# Patient Record
Sex: FEMALE | Race: WHITE | NOT HISPANIC OR LATINO
[De-identification: names, ages, dates, MRNs, and addresses within clinical notes are randomized per-mention and may not be internally consistent; named-entity substitution may affect disease eponyms.]

---

## 2018-03-13 ENCOUNTER — TRANSCRIPTION ENCOUNTER (OUTPATIENT)
Age: 53
End: 2018-03-13

## 2019-05-22 ENCOUNTER — TRANSCRIPTION ENCOUNTER (OUTPATIENT)
Age: 54
End: 2019-05-22

## 2020-02-04 PROBLEM — Z00.00 ENCOUNTER FOR PREVENTIVE HEALTH EXAMINATION: Status: ACTIVE | Noted: 2020-02-04

## 2020-02-05 ENCOUNTER — APPOINTMENT (OUTPATIENT)
Dept: GASTROENTEROLOGY | Facility: CLINIC | Age: 55
End: 2020-02-05
Payer: COMMERCIAL

## 2020-02-05 VITALS
OXYGEN SATURATION: 96 % | SYSTOLIC BLOOD PRESSURE: 122 MMHG | BODY MASS INDEX: 38.45 KG/M2 | HEART RATE: 85 BPM | WEIGHT: 245 LBS | HEIGHT: 67 IN | RESPIRATION RATE: 14 BRPM | DIASTOLIC BLOOD PRESSURE: 80 MMHG

## 2020-02-05 DIAGNOSIS — Z78.9 OTHER SPECIFIED HEALTH STATUS: ICD-10-CM

## 2020-02-05 DIAGNOSIS — Z86.79 PERSONAL HISTORY OF OTHER DISEASES OF THE CIRCULATORY SYSTEM: ICD-10-CM

## 2020-02-05 DIAGNOSIS — Z86.39 PERSONAL HISTORY OF OTHER ENDOCRINE, NUTRITIONAL AND METABOLIC DISEASE: ICD-10-CM

## 2020-02-05 DIAGNOSIS — I25.2 OLD MYOCARDIAL INFARCTION: ICD-10-CM

## 2020-02-05 DIAGNOSIS — Z12.11 ENCOUNTER FOR SCREENING FOR MALIGNANT NEOPLASM OF COLON: ICD-10-CM

## 2020-02-05 DIAGNOSIS — Z80.1 FAMILY HISTORY OF MALIGNANT NEOPLASM OF TRACHEA, BRONCHUS AND LUNG: ICD-10-CM

## 2020-02-05 DIAGNOSIS — Z82.49 FAMILY HISTORY OF ISCHEMIC HEART DISEASE AND OTHER DISEASES OF THE CIRCULATORY SYSTEM: ICD-10-CM

## 2020-02-05 PROCEDURE — 99244 OFF/OP CNSLTJ NEW/EST MOD 40: CPT

## 2020-02-05 RX ORDER — AMLODIPINE BESYLATE 5 MG/1
TABLET ORAL
Refills: 0 | Status: ACTIVE | COMMUNITY

## 2020-02-05 RX ORDER — ATORVASTATIN CALCIUM 80 MG/1
TABLET, FILM COATED ORAL
Refills: 0 | Status: ACTIVE | COMMUNITY

## 2020-02-05 RX ORDER — EPLERENONE 50 MG/1
TABLET, COATED ORAL
Refills: 0 | Status: ACTIVE | COMMUNITY

## 2020-02-05 RX ORDER — ESCITALOPRAM OXALATE 5 MG/1
TABLET, FILM COATED ORAL
Refills: 0 | Status: ACTIVE | COMMUNITY

## 2020-02-05 NOTE — HISTORY OF PRESENT ILLNESS
[de-identified] : 55 y/o woman with hx of MI, CAD s/p stent in Aug of 2018 on aspirin only, HTN, hyperlipidemia, who is referred by her cardiologist for a screening colonoscopy. \par \par \par Never had a colonoscopy. \par \par Patient reports that she had stent in 2018 and not in 2019 as written by cardiologist -she says this is an error.  She was on Brilinta and taken off in Sept of 2019. \par \par Pt denies having abdominal pain, heartburn, dysphagia, odynophagia, nausea, vomiting, fevers, chills, jaundice, loss of appetite, wt loss, constipation, diarrhea, hematochezia and melena.\par \par Patient denies family hx of GI cancers.\par \par All other review of systems are negative.  Denies cardiac symptoms.\par \par \par \par

## 2020-02-05 NOTE — ASSESSMENT
[FreeTextEntry1] : 53 y/o woman with hx of MI, CAD s/p stent in Aug of 2018 on aspirin only, HTN, hyperlipidemia, who is referred by her cardiologist for a screening colonoscopy. \par \par I had a long discuss with the patient regarding colon cancer in the [place].    \par \par We reviewed risk factors for colon cancer which include age, having comorbidities such as obesity, DM, cardiac disease, having nicotine addiction, alcohol addiction, having a family hx of colon cancer, cancer syndromes, etc.  \par \par Patient is average risk for colon cancer.  \par \par We reviewed the screening tests for colon cancer prevention.  We discussed screening tests such as stool test, CT scans such as CT colonography, and a colonoscopy.    Patient was made aware that despite these screening test, a cancerous lesion can still be missed.  The gold standard test is a screening colonoscopy.\par \par I have recommended a colonoscopy to rule out colon polyps, colorectal cancer etc. under monitored anesthesia care.  Risks such as perforation requiring surgery, bleeding, infection, diverticulitis, colitis, missed colon cancer (2% to 6%), internal organ injury, etc, risks of bowel prep including colitis, syncope, adverse reaction to medication etc. and risks of anesthesia including cardiopulmonary compromise were discussed with patient.  Patient verbalized understanding.  She would to think about the screening test and let us know how she wishes.  \par \par She would need clearance from her cardiologist if she does decide to undergo a coloscopy.  She was advised not  to off on aspirin if she decides to undergo a colonoscopy. \par

## 2020-02-05 NOTE — PHYSICAL EXAM
[General Appearance - In No Acute Distress] : in no acute distress [General Appearance - Alert] : alert [Sclera] : the sclera and conjunctiva were normal [Extraocular Movements] : extraocular movements were intact [Outer Ear] : the ears and nose were normal in appearance [Hearing Threshold Finger Rub Not [place]] : hearing was normal [Neck Appearance] : the appearance of the neck was normal [Neck Cervical Mass (___cm)] : no neck mass was observed [Auscultation Breath Sounds / Voice Sounds] : lungs were clear to auscultation bilaterally [Heart Rate And Rhythm] : heart rate was normal and rhythm regular [Heart Sounds] : normal S1 and S2 [Heart Sounds Gallop] : no gallops [Murmurs] : no murmurs [Heart Sounds Pericardial Friction Rub] : no pericardial rub [Bowel Sounds] : normal bowel sounds [Abdomen Tenderness] : non-tender [Abdomen Soft] : soft [Abdomen Mass (___ Cm)] : no abdominal mass palpated [Cervical Lymph Nodes Enlarged Posterior Bilaterally] : posterior cervical [Cervical Lymph Nodes Enlarged Anterior Bilaterally] : anterior cervical [Supraclavicular Lymph Nodes Enlarged Bilaterally] : supraclavicular [Abnormal Walk] : normal gait [Nail Clubbing] : no clubbing  or cyanosis of the fingernails [Skin Color & Pigmentation] : normal skin color and pigmentation [Oriented To Time, Place, And Person] : oriented to person, place, and time [] : no rash [Impaired Insight] : insight and judgment were intact [Affect] : the affect was normal

## 2020-05-15 ENCOUNTER — TRANSCRIPTION ENCOUNTER (OUTPATIENT)
Age: 55
End: 2020-05-15

## 2020-09-23 ENCOUNTER — RECORD ABSTRACTING (OUTPATIENT)
Age: 55
End: 2020-09-23

## 2020-09-29 ENCOUNTER — APPOINTMENT (OUTPATIENT)
Dept: OTOLARYNGOLOGY | Facility: CLINIC | Age: 55
End: 2020-09-29

## 2020-12-16 ENCOUNTER — TRANSCRIPTION ENCOUNTER (OUTPATIENT)
Age: 55
End: 2020-12-16

## 2021-03-17 ENCOUNTER — TRANSCRIPTION ENCOUNTER (OUTPATIENT)
Age: 56
End: 2021-03-17

## 2021-06-14 ENCOUNTER — TRANSCRIPTION ENCOUNTER (OUTPATIENT)
Age: 56
End: 2021-06-14

## 2021-09-03 ENCOUNTER — TRANSCRIPTION ENCOUNTER (OUTPATIENT)
Age: 56
End: 2021-09-03

## 2022-04-01 ENCOUNTER — EMERGENCY (EMERGENCY)
Facility: HOSPITAL | Age: 57
LOS: 1 days | Discharge: HOME | End: 2022-04-01
Attending: EMERGENCY MEDICINE | Admitting: EMERGENCY MEDICINE
Payer: COMMERCIAL

## 2022-04-01 VITALS
SYSTOLIC BLOOD PRESSURE: 165 MMHG | OXYGEN SATURATION: 100 % | TEMPERATURE: 97 F | WEIGHT: 240.08 LBS | RESPIRATION RATE: 16 BRPM | DIASTOLIC BLOOD PRESSURE: 92 MMHG | HEART RATE: 81 BPM

## 2022-04-01 DIAGNOSIS — I21.9 ACUTE MYOCARDIAL INFARCTION, UNSPECIFIED: Chronic | ICD-10-CM

## 2022-04-01 DIAGNOSIS — Z90.5 ACQUIRED ABSENCE OF KIDNEY: Chronic | ICD-10-CM

## 2022-04-01 DIAGNOSIS — Z98.891 HISTORY OF UTERINE SCAR FROM PREVIOUS SURGERY: Chronic | ICD-10-CM

## 2022-04-01 DIAGNOSIS — Z98.890 OTHER SPECIFIED POSTPROCEDURAL STATES: Chronic | ICD-10-CM

## 2022-04-01 DIAGNOSIS — Z95.828 PRESENCE OF OTHER VASCULAR IMPLANTS AND GRAFTS: Chronic | ICD-10-CM

## 2022-04-01 LAB
ALBUMIN SERPL ELPH-MCNC: 3.8 G/DL — SIGNIFICANT CHANGE UP (ref 3.3–5)
ALP SERPL-CCNC: 124 U/L — HIGH (ref 40–120)
ALT FLD-CCNC: 38 U/L — SIGNIFICANT CHANGE UP (ref 12–78)
ANION GAP SERPL CALC-SCNC: 9 MMOL/L — SIGNIFICANT CHANGE UP (ref 5–17)
APPEARANCE UR: CLEAR — SIGNIFICANT CHANGE UP
AST SERPL-CCNC: 33 U/L — SIGNIFICANT CHANGE UP (ref 15–37)
BASOPHILS # BLD AUTO: 0.04 K/UL — SIGNIFICANT CHANGE UP (ref 0–0.2)
BASOPHILS NFR BLD AUTO: 0.6 % — SIGNIFICANT CHANGE UP (ref 0–2)
BILIRUB SERPL-MCNC: 0.5 MG/DL — SIGNIFICANT CHANGE UP (ref 0.2–1.2)
BILIRUB UR-MCNC: NEGATIVE — SIGNIFICANT CHANGE UP
BUN SERPL-MCNC: 23 MG/DL — SIGNIFICANT CHANGE UP (ref 7–23)
CALCIUM SERPL-MCNC: 9.3 MG/DL — SIGNIFICANT CHANGE UP (ref 8.5–10.1)
CHLORIDE SERPL-SCNC: 105 MMOL/L — SIGNIFICANT CHANGE UP (ref 96–108)
CO2 SERPL-SCNC: 27 MMOL/L — SIGNIFICANT CHANGE UP (ref 22–31)
COLOR SPEC: YELLOW — SIGNIFICANT CHANGE UP
CREAT SERPL-MCNC: 1.1 MG/DL — SIGNIFICANT CHANGE UP (ref 0.5–1.3)
DIFF PNL FLD: NEGATIVE — SIGNIFICANT CHANGE UP
EGFR: 58 ML/MIN/1.73M2 — LOW
EOSINOPHIL # BLD AUTO: 0.3 K/UL — SIGNIFICANT CHANGE UP (ref 0–0.5)
EOSINOPHIL NFR BLD AUTO: 4.7 % — SIGNIFICANT CHANGE UP (ref 0–6)
GLUCOSE SERPL-MCNC: 80 MG/DL — SIGNIFICANT CHANGE UP (ref 70–99)
GLUCOSE UR QL: NEGATIVE — SIGNIFICANT CHANGE UP
HCT VFR BLD CALC: 39 % — SIGNIFICANT CHANGE UP (ref 34.5–45)
HGB BLD-MCNC: 12.7 G/DL — SIGNIFICANT CHANGE UP (ref 11.5–15.5)
IMM GRANULOCYTES NFR BLD AUTO: 0.3 % — SIGNIFICANT CHANGE UP (ref 0–1.5)
KETONES UR-MCNC: NEGATIVE — SIGNIFICANT CHANGE UP
LEUKOCYTE ESTERASE UR-ACNC: NEGATIVE — SIGNIFICANT CHANGE UP
LIDOCAIN IGE QN: 122 U/L — SIGNIFICANT CHANGE UP (ref 73–393)
LYMPHOCYTES # BLD AUTO: 0.98 K/UL — LOW (ref 1–3.3)
LYMPHOCYTES # BLD AUTO: 15.4 % — SIGNIFICANT CHANGE UP (ref 13–44)
MCHC RBC-ENTMCNC: 25.1 PG — LOW (ref 27–34)
MCHC RBC-ENTMCNC: 32.6 GM/DL — SIGNIFICANT CHANGE UP (ref 32–36)
MCV RBC AUTO: 77.1 FL — LOW (ref 80–100)
MONOCYTES # BLD AUTO: 0.59 K/UL — SIGNIFICANT CHANGE UP (ref 0–0.9)
MONOCYTES NFR BLD AUTO: 9.2 % — SIGNIFICANT CHANGE UP (ref 2–14)
NEUTROPHILS # BLD AUTO: 4.45 K/UL — SIGNIFICANT CHANGE UP (ref 1.8–7.4)
NEUTROPHILS NFR BLD AUTO: 69.8 % — SIGNIFICANT CHANGE UP (ref 43–77)
NITRITE UR-MCNC: NEGATIVE — SIGNIFICANT CHANGE UP
NRBC # BLD: 0 /100 WBCS — SIGNIFICANT CHANGE UP (ref 0–0)
PH UR: 6 — SIGNIFICANT CHANGE UP (ref 5–8)
PLATELET # BLD AUTO: 205 K/UL — SIGNIFICANT CHANGE UP (ref 150–400)
POTASSIUM SERPL-MCNC: 4.5 MMOL/L — SIGNIFICANT CHANGE UP (ref 3.5–5.3)
POTASSIUM SERPL-SCNC: 4.5 MMOL/L — SIGNIFICANT CHANGE UP (ref 3.5–5.3)
PROT SERPL-MCNC: 7.8 G/DL — SIGNIFICANT CHANGE UP (ref 6–8.3)
PROT UR-MCNC: NEGATIVE — SIGNIFICANT CHANGE UP
RBC # BLD: 5.06 M/UL — SIGNIFICANT CHANGE UP (ref 3.8–5.2)
RBC # FLD: 16.1 % — HIGH (ref 10.3–14.5)
SARS-COV-2 RNA SPEC QL NAA+PROBE: SIGNIFICANT CHANGE UP
SODIUM SERPL-SCNC: 141 MMOL/L — SIGNIFICANT CHANGE UP (ref 135–145)
SP GR SPEC: 1.01 — SIGNIFICANT CHANGE UP (ref 1.01–1.02)
UROBILINOGEN FLD QL: NEGATIVE — SIGNIFICANT CHANGE UP
WBC # BLD: 6.38 K/UL — SIGNIFICANT CHANGE UP (ref 3.8–10.5)
WBC # FLD AUTO: 6.38 K/UL — SIGNIFICANT CHANGE UP (ref 3.8–10.5)

## 2022-04-01 PROCEDURE — 99285 EMERGENCY DEPT VISIT HI MDM: CPT

## 2022-04-01 PROCEDURE — 76705 ECHO EXAM OF ABDOMEN: CPT | Mod: 26

## 2022-04-01 RX ORDER — SODIUM CHLORIDE 9 MG/ML
1000 INJECTION INTRAMUSCULAR; INTRAVENOUS; SUBCUTANEOUS ONCE
Refills: 0 | Status: COMPLETED | OUTPATIENT
Start: 2022-04-01 | End: 2022-04-01

## 2022-04-01 RX ORDER — ONDANSETRON 8 MG/1
4 TABLET, FILM COATED ORAL ONCE
Refills: 0 | Status: COMPLETED | OUTPATIENT
Start: 2022-04-01 | End: 2022-04-01

## 2022-04-01 RX ORDER — MORPHINE SULFATE 50 MG/1
4 CAPSULE, EXTENDED RELEASE ORAL ONCE
Refills: 0 | Status: DISCONTINUED | OUTPATIENT
Start: 2022-04-01 | End: 2022-04-01

## 2022-04-01 RX ADMIN — SODIUM CHLORIDE 1000 MILLILITER(S): 9 INJECTION INTRAMUSCULAR; INTRAVENOUS; SUBCUTANEOUS at 21:08

## 2022-04-01 RX ADMIN — SODIUM CHLORIDE 1000 MILLILITER(S): 9 INJECTION INTRAMUSCULAR; INTRAVENOUS; SUBCUTANEOUS at 22:08

## 2022-04-01 RX ADMIN — MORPHINE SULFATE 4 MILLIGRAM(S): 50 CAPSULE, EXTENDED RELEASE ORAL at 21:39

## 2022-04-01 RX ADMIN — ONDANSETRON 4 MILLIGRAM(S): 8 TABLET, FILM COATED ORAL at 21:09

## 2022-04-01 RX ADMIN — MORPHINE SULFATE 4 MILLIGRAM(S): 50 CAPSULE, EXTENDED RELEASE ORAL at 21:09

## 2022-04-01 NOTE — ED PROVIDER NOTE - PROGRESS NOTE DETAILS
pt on repeat exam after surg consut more ttp over lower ribs, chest muscle, no pleuritic paiin or sob, will d/c iwht pain meds, fu pmd

## 2022-04-01 NOTE — ED PROVIDER NOTE - NSFOLLOWUPINSTRUCTIONS_ED_ALL_ED_FT
take percocet 1-2 tabs every 6 hours as needed for pain,  return for fever, shortness of breath, worsening pain.  call dr cox on monday for follow up.

## 2022-04-01 NOTE — ED PROVIDER NOTE - CLINICAL SUMMARY MEDICAL DECISION MAKING FREE TEXT BOX
Pt is a 57 yo female hx cad with stents,  c/o RUQ pain x 5 days. Pt states that pain is getting worse, constant and sharp, no  alleviating factors. States that she was nauseous today. Pt was seen in urgent care yesterday and referred to get US of gallbladder but has not had it done yet. Denies fever, V/D, CP, SOB.  pt states pain in worse on sitting up and laughing, non pleuritic.  pt on exam uncomfortable,  lungs cta, abd with ruq on initial exam, no murphys, labs essentially unremarkable.  surg consulted,  Pt tx with pain meds, ivf.  surg consult revealed ttp more over right lower rib cage anteriorly,  repeat exam c/w surg, pt wishes to leave and refusing cxr, will dc on pain meds, return for fever or increased sx.  fu pmd

## 2022-04-01 NOTE — ED PROVIDER NOTE - CARE PROVIDER_API CALL
Godwin Morillo  [facility] MEDICINE  Internal Medicine, [address], [address]  [place] [address]  Phone: [phone]  Fax: [phone]  Follow Up Time: 1-3 Days

## 2022-04-01 NOTE — ED PROVIDER NOTE - PATIENT PORTAL LINK FT
You can access the FollowMyHealth Patient Portal offered by [facility] by registering at the following website: [email or web address]. By joining [employer]’s FollowMyHealth portal, you will also be able to view your health information using other applications (apps) compatible with our system.

## 2022-04-01 NOTE — ED ADULT TRIAGE NOTE - CHIEF COMPLAINT QUOTE
RUQ abdominal pain x3 days, worsened today. Intermittent nausea, denies vomiting. Pt applied lidocaine patch to RUQ with minimal relief.

## 2022-04-01 NOTE — ED PROVIDER NOTE - OBJECTIVE STATEMENT
55 y/o F with hx of CAD with stent on asa, HTN, HLD, hx of L nephrectomy (donated kidney) presents with c/o RUQ pain x 5 days. Pt states that pain is getting worse, constant and sharp, no provoking or alleviating factors. States that she was nauseous today. Pt was seen in urgent care yesterday and referred to get US of gallbladder but has not had it done yet. Denies fever, V/D, CP, SOB, hx of abdominal surgeries, urinary symptoms or other symptoms.

## 2022-04-01 NOTE — ED ADULT NURSE NOTE - OBJECTIVE STATEMENT
Pt presents to the ED s/p right upper quad pain since Monday night, worse today after eating at [employer]. Pt c/o some nausea.

## 2022-04-01 NOTE — ED PROVIDER NOTE - NS ED ATTENDING STATEMENT MOD
This was a shared visit with the VANDANA. I reviewed and verified the documentation and independently performed the documented:

## 2022-04-01 NOTE — ED PROVIDER NOTE - CARE PLAN
1 Principal Discharge DX:	Abdominal pain   Principal Discharge DX:	Abdominal pain  Secondary Diagnosis:	Chest wall pain

## 2022-04-01 NOTE — ED ADULT NURSE NOTE - NSICDXPASTSURGICALHX_GEN_ALL_CORE_FT
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

## 2022-04-01 NOTE — ED ADULT NURSE NOTE - NSIMPLEMENTINTERV_GEN_ALL_ED
Implemented All Universal Safety Interventions:  [place] to call system. Call bell, personal items and telephone within reach. Instruct patient to call for assistance. Room bathroom lighting operational. Non-slip footwear when patient is off stretcher. Physically safe environment: no spills, clutter or unnecessary equipment. Stretcher in lowest position, wheels locked, appropriate side rails in place.

## 2022-04-01 NOTE — ED PROVIDER NOTE - CONSTITUTIONAL, MLM
normal... Well appearing, awake, alert, oriented to person, place, time/situation and in moderate distress from pain

## 2022-04-02 ENCOUNTER — TRANSCRIPTION ENCOUNTER (OUTPATIENT)
Age: 57
End: 2022-04-02

## 2022-04-02 VITALS
SYSTOLIC BLOOD PRESSURE: 112 MMHG | OXYGEN SATURATION: 98 % | DIASTOLIC BLOOD PRESSURE: 80 MMHG | RESPIRATION RATE: 17 BRPM | TEMPERATURE: 98 F | HEART RATE: 84 BPM

## 2022-04-02 PROCEDURE — 96375 TX/PRO/DX INJ NEW DRUG ADDON: CPT

## 2022-04-02 PROCEDURE — 83690 ASSAY OF LIPASE: CPT

## 2022-04-02 PROCEDURE — 80053 COMPREHEN METABOLIC PANEL: CPT

## 2022-04-02 PROCEDURE — 76705 ECHO EXAM OF ABDOMEN: CPT

## 2022-04-02 PROCEDURE — 87635 SARS-COV-2 COVID-19 AMP PRB: CPT

## 2022-04-02 PROCEDURE — 81003 URINALYSIS AUTO W/O SCOPE: CPT

## 2022-04-02 PROCEDURE — 99284 EMERGENCY DEPT VISIT MOD MDM: CPT | Mod: 25

## 2022-04-02 PROCEDURE — 85025 COMPLETE CBC W/AUTO DIFF WBC: CPT

## 2022-04-02 PROCEDURE — 96374 THER/PROPH/DIAG INJ IV PUSH: CPT

## 2022-04-02 PROCEDURE — 36415 COLL VENOUS BLD VENIPUNCTURE: CPT

## 2022-04-02 RX ORDER — OXYCODONE HYDROCHLORIDE 5 MG/1
2 TABLET ORAL
Qty: 24 | Refills: 0
Start: 2022-04-02 | End: 2022-04-04

## 2022-04-02 RX ORDER — OXYCODONE AND ACETAMINOPHEN 5; 325 MG/1; MG/1
1 TABLET ORAL ONCE
Refills: 0 | Status: DISCONTINUED | OUTPATIENT
Start: 2022-04-02 | End: 2022-04-02

## 2022-04-02 RX ORDER — ONDANSETRON 8 MG/1
1 TABLET, FILM COATED ORAL
Qty: 9 | Refills: 0
Start: 2022-04-02 | End: 2022-04-04

## 2022-04-02 RX ORDER — OXYCODONE AND ACETAMINOPHEN 5; 325 MG/1; MG/1
2 TABLET ORAL ONCE
Refills: 0 | Status: DISCONTINUED | OUTPATIENT
Start: 2022-04-02 | End: 2022-04-02

## 2022-04-02 RX ADMIN — OXYCODONE AND ACETAMINOPHEN 1 TABLET(S): 5; 325 TABLET ORAL at 03:30

## 2022-04-02 NOTE — CONSULT NOTE ADULT - SUBJECTIVE AND OBJECTIVE BOX
SAURABH ALVAREZ  [id number]  56y    THIS IS A 57 YO FEMALE WITH PAST MEDICAL HISTORY OF CAD, MI  S/P X2  CORONARY STENT 2018, HTN, HYPERLIPEMIA EXPERIENCING RIGHT UPPER ABDOMINAL DISCOMFORT SINCE MONDAY WITH NO AGGREGATING / ALLEVIATING FACTORS EXCEPT FOR  EXERCISES DURING PHYSICAL THERAPY FOR HER RECENT LEFT SHOULDER REPLACEMENT. SHE WAS SEEN IN URGENT CARE CENTER AND WAS GIVEN RX FOR ABDOMINAL US. SHE HAS AN APPOINTMENT WITH RADIOLOGY ON MONDAY. FOR THE FIRST TIME  PAIN GOT WORSE AFTER EATING [employer] AND SHE DECIDED TO COME TO HOSPITAL.    SHE DENIES ANY FEVER, CHILLS, N/V, NSAIDs USE, RECENT RAVEL, HEPATITIS, ETOH USE, FOOD INTOLERANCE, CHANGE IN BOWEL HABITS, PERSONAL/ FAMILY  HISTORY OF GALLSTONES, SOB, PLEURITIC CHEST PAIN, COUGH, TRAUMA / FALLS. SHE HAD GASTRIC / DUODENAL ULCER MANY YEARS AGO AND HAS NOT HAD ANY SYMPTOMS FOR YEARS.     HER RUQ PAIN/ DISCOMFORT IS POSITIONAL, INTENSIFIES BY MOVEMENTS OF HER RIGHT ARM AND RADIATES TO POSTERIOR RIB CAGE.                 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   History of   H/O kidney removal , S/P  LAPAROSCOPY LEFT KIDNEY DONATION   Acute myocardial infarction   H/O T7-T9 COMPRESSION FX POST FALL 2 YEARS AGO     Allergies  No Known Allergies    TOBACCO USE: DENIES   ALCOHOL USE: DENIES   DRUG USE: DENIES       REVIEW OF SYSTEMS: SEE PAST MEDICAL AND SURGICAL HISTORY    CONSTITUTIONAL: No weakness, fevers or chills  EYES/ENT: No visual changes;  No vertigo or throat pain   NECK: No pain or stiffness  RESPIRATORY: No cough, wheezing, hemoptysis; No shortness of breath  CARDIOVASCULAR: No chest pain or palpitations  GASTROINTESTINAL: SEE HPI   GENITOURINARY: No dysuria, frequency or hematuria  NEUROLOGICAL: No numbness or weakness  SKIN: No itching, rashes       Vital Signs (24 Hrs):  T(C): 36.3 (22 @ 22:29), Max: 36.3 (22 @ 22:29)  HR: 73 (22 @ 22:29) (73 - 81)  BP: 110/75 (22 @ 22:29) (110/75 - 165/92)  RR: 18 (22 @ 22:29) (16 - 18)  SpO2: 97% (22 @ 22:29) (97% - 100%)    PHYSICAL EXAM:    GENERAL: NAD  HEAD:  ATRAUMATIC, NORMOCEPHALIC  EYES: EOMI, PERRLA, CONJUNCTIVA AND SCLERA CLEAR   ENT: MOIST MUCOUS MEMBRANE   NECK: SUPPLE, NO JVD  CHEST/LUNG: TENDERNESS OVER RIGHT ANTERIOR > POSTERIOR RIB CAGE, MORE PRONOUNCED UNDER RIGHT BREAST, NO OBVIOUS TRAUMA. BRUISING. CLEAR TO AUSCULTATION, NO W/R/R  HEART: REGULAR RATE, RHYTHM, NO MURMUR, RUBS, GALLOPS  ABDOMEN: WELL HEALED  PFANNENSTIEL SCAR, OBESE, + BS, SOFT, NOT DISTENDED, NO PALPABLE MASS, NON TENDER TO PALPATION, NEGATIVE ANDRES'S SIGN, NO GUARDING/ RIGIDITY. NO CVA  TENDERNESS   EXTREMITIES:  2+ PERIPHERAL PULSES, BRISK CAPILLARY REFILL. NO CLUBBING, CYANOSIS, OR EDEMA.   NERVOUS SYSTEM: ALERT AND ORIENTED X3, CLEAR SPEECH . NO DEFICITS   MUSCULOSKELETAL : SCAR OVER LEFT SHOULDER , DECREASE ROM LEFT SHOULDER, FULL AND EQUAL STRENGTH   SKIN: NO RASH, INTACT                       12.7   6.38  )-----------( 205      ( 2022 21:06 )             39.0     2022 21:06    141    |  105    |  23     ----------------------------<  80     4.5     |  27     |  1.10     Ca    9.3        2022 21:06    TPro  7.8    /  Alb  3.8    /  TBili  0.5    /  DBili  x      /  AST  33     /  ALT  38     /  AlkPhos  124    2022 21:06    Urinalysis Basic - ( 2022 21:06 )    Color: Yellow / Appearance: Clear / S.015 / pH: x  Gluc: x / Ketone: Negative  / Bili: Negative / Urobili: Negative   Blood: x / Protein: Negative / Nitrite: Negative   Leuk Esterase: Negative / RBC: x / WBC x   Sq Epi: x / Non Sq Epi: x / Bacteria: x    COVID-19 PCR: NotDetec (2022 21:06)    ACC: [id number] EXAM:  US ABDOMEN RT UPR QUADRANT                        PROCEDURE DATE:  2022      INTERPRETATION:  CLINICAL INFORMATION: Right upper quadrant pain.    COMPARISON: None available.    TECHNIQUE: Sonography of the right upper quadrant.    FINDINGS:    Liver: Within normal limits.  Bile ducts: Normal caliber. Common bile duct measures 6 mm.  Gallbladder: No cholelithiasis, gallbladder wall thickening, or   pericholecystic fluid. Negative sonographic Andres sign, however patient   was premedicated prior to the examination.  Pancreas: Poorly visualized.  Right kidney: 11.4 cm. No hydronephrosis.  Ascites: None.  Aorta/IVC: Poorly visualized.    IMPRESSION:    No cholelithiasis or sonographic evidence of acute cholecystitis.    RAMANA REGAN MD; Attending Radiologist    ASSESSMENT AND PLAN     RIGHT RIB CAGE PAIN,   ONE EPISODE OF POST PRANDIAL RUQ / RIB CAGE PAIN., NO OTHER SYMPTOMS, LABORATORY OR US EVIDENCE OF GALLBLADDER PATHOLOGY/ CHOLECYSTITIS   CAD, MI  S/P X2  CORONARY STENT 2018, HTN, HYPERLIPEMIA   S/P LAPAROSCOPIC LEFT KIDNEY DONATION     UNLIKELY RIGHT RIB CAGE RELATED TO INTRA - ABDOMINAL / GB PATHOLOGY OR PUD   RECOMMEND CHEST X-RAY NOW   CONSIDER CT CHEST, ABDOMEN  AND PELVIS IF SYMPTOMS NOT IMPROVED   MAY FOLLOW UP WITH DR. ANDERSON AS OUT PATIENT    PLAN DISCUSSED WITH ER DR. MCCAIN , PATIENT AND HER

## 2022-06-27 ENCOUNTER — NON-APPOINTMENT (OUTPATIENT)
Age: 57
End: 2022-06-27

## 2022-09-29 ENCOUNTER — EMERGENCY (EMERGENCY)
Facility: HOSPITAL | Age: 57
LOS: 1 days | Discharge: HOME | End: 2022-09-29
Attending: EMERGENCY MEDICINE | Admitting: EMERGENCY MEDICINE
Payer: COMMERCIAL

## 2022-09-29 VITALS
TEMPERATURE: 98 F | HEART RATE: 80 BPM | DIASTOLIC BLOOD PRESSURE: 67 MMHG | OXYGEN SATURATION: 100 % | SYSTOLIC BLOOD PRESSURE: 154 MMHG | RESPIRATION RATE: 16 BRPM

## 2022-09-29 VITALS
HEIGHT: 67 IN | HEART RATE: 60 BPM | RESPIRATION RATE: 17 BRPM | TEMPERATURE: 98 F | OXYGEN SATURATION: 99 % | DIASTOLIC BLOOD PRESSURE: 78 MMHG | WEIGHT: 218.04 LBS | SYSTOLIC BLOOD PRESSURE: 138 MMHG

## 2022-09-29 DIAGNOSIS — Z98.890 OTHER SPECIFIED POSTPROCEDURAL STATES: Chronic | ICD-10-CM

## 2022-09-29 DIAGNOSIS — Z98.891 HISTORY OF UTERINE SCAR FROM PREVIOUS SURGERY: Chronic | ICD-10-CM

## 2022-09-29 DIAGNOSIS — Z95.828 PRESENCE OF OTHER VASCULAR IMPLANTS AND GRAFTS: Chronic | ICD-10-CM

## 2022-09-29 DIAGNOSIS — I21.9 ACUTE MYOCARDIAL INFARCTION, UNSPECIFIED: Chronic | ICD-10-CM

## 2022-09-29 DIAGNOSIS — Z90.5 ACQUIRED ABSENCE OF KIDNEY: Chronic | ICD-10-CM

## 2022-09-29 PROBLEM — I10 ESSENTIAL (PRIMARY) HYPERTENSION: Chronic | Status: ACTIVE | Noted: 2022-04-01

## 2022-09-29 PROBLEM — E78.5 HYPERLIPIDEMIA, UNSPECIFIED: Chronic | Status: ACTIVE | Noted: 2022-04-01

## 2022-09-29 LAB
ALBUMIN SERPL ELPH-MCNC: 3.6 G/DL — SIGNIFICANT CHANGE UP (ref 3.3–5)
ALP SERPL-CCNC: 116 U/L — SIGNIFICANT CHANGE UP (ref 40–120)
ALT FLD-CCNC: 28 U/L — SIGNIFICANT CHANGE UP (ref 12–78)
ANION GAP SERPL CALC-SCNC: 7 MMOL/L — SIGNIFICANT CHANGE UP (ref 5–17)
APPEARANCE UR: CLEAR — SIGNIFICANT CHANGE UP
AST SERPL-CCNC: 20 U/L — SIGNIFICANT CHANGE UP (ref 15–37)
BASOPHILS # BLD AUTO: 0.03 K/UL — SIGNIFICANT CHANGE UP (ref 0–0.2)
BASOPHILS NFR BLD AUTO: 0.4 % — SIGNIFICANT CHANGE UP (ref 0–2)
BILIRUB SERPL-MCNC: 0.9 MG/DL — SIGNIFICANT CHANGE UP (ref 0.2–1.2)
BILIRUB UR-MCNC: NEGATIVE — SIGNIFICANT CHANGE UP
BUN SERPL-MCNC: 21 MG/DL — SIGNIFICANT CHANGE UP (ref 7–23)
CALCIUM SERPL-MCNC: 9.3 MG/DL — SIGNIFICANT CHANGE UP (ref 8.5–10.1)
CHLORIDE SERPL-SCNC: 105 MMOL/L — SIGNIFICANT CHANGE UP (ref 96–108)
CO2 SERPL-SCNC: 28 MMOL/L — SIGNIFICANT CHANGE UP (ref 22–31)
COLOR SPEC: SIGNIFICANT CHANGE UP
CREAT SERPL-MCNC: 1.3 MG/DL — SIGNIFICANT CHANGE UP (ref 0.5–1.3)
DIFF PNL FLD: NEGATIVE — SIGNIFICANT CHANGE UP
EGFR: 47 ML/MIN/1.73M2 — LOW
EOSINOPHIL # BLD AUTO: 0.21 K/UL — SIGNIFICANT CHANGE UP (ref 0–0.5)
EOSINOPHIL NFR BLD AUTO: 3 % — SIGNIFICANT CHANGE UP (ref 0–6)
GLUCOSE SERPL-MCNC: 101 MG/DL — HIGH (ref 70–99)
GLUCOSE UR QL: NEGATIVE — SIGNIFICANT CHANGE UP
HCT VFR BLD CALC: 36.7 % — SIGNIFICANT CHANGE UP (ref 34.5–45)
HGB BLD-MCNC: 11.8 G/DL — SIGNIFICANT CHANGE UP (ref 11.5–15.5)
IMM GRANULOCYTES NFR BLD AUTO: 0.1 % — SIGNIFICANT CHANGE UP (ref 0–0.9)
KETONES UR-MCNC: NEGATIVE — SIGNIFICANT CHANGE UP
LEUKOCYTE ESTERASE UR-ACNC: ABNORMAL
LIDOCAIN IGE QN: 91 U/L — SIGNIFICANT CHANGE UP (ref 73–393)
LYMPHOCYTES # BLD AUTO: 0.82 K/UL — LOW (ref 1–3.3)
LYMPHOCYTES # BLD AUTO: 11.6 % — LOW (ref 13–44)
MCHC RBC-ENTMCNC: 25.6 PG — LOW (ref 27–34)
MCHC RBC-ENTMCNC: 32.2 GM/DL — SIGNIFICANT CHANGE UP (ref 32–36)
MCV RBC AUTO: 79.6 FL — LOW (ref 80–100)
MONOCYTES # BLD AUTO: 0.63 K/UL — SIGNIFICANT CHANGE UP (ref 0–0.9)
MONOCYTES NFR BLD AUTO: 8.9 % — SIGNIFICANT CHANGE UP (ref 2–14)
NEUTROPHILS # BLD AUTO: 5.37 K/UL — SIGNIFICANT CHANGE UP (ref 1.8–7.4)
NEUTROPHILS NFR BLD AUTO: 76 % — SIGNIFICANT CHANGE UP (ref 43–77)
NITRITE UR-MCNC: NEGATIVE — SIGNIFICANT CHANGE UP
NRBC # BLD: 0 /100 WBCS — SIGNIFICANT CHANGE UP (ref 0–0)
PH UR: 6.5 — SIGNIFICANT CHANGE UP (ref 5–8)
PLATELET # BLD AUTO: 163 K/UL — SIGNIFICANT CHANGE UP (ref 150–400)
POTASSIUM SERPL-MCNC: 4.3 MMOL/L — SIGNIFICANT CHANGE UP (ref 3.5–5.3)
POTASSIUM SERPL-SCNC: 4.3 MMOL/L — SIGNIFICANT CHANGE UP (ref 3.5–5.3)
PROT SERPL-MCNC: 7 G/DL — SIGNIFICANT CHANGE UP (ref 6–8.3)
PROT UR-MCNC: NEGATIVE — SIGNIFICANT CHANGE UP
RBC # BLD: 4.61 M/UL — SIGNIFICANT CHANGE UP (ref 3.8–5.2)
RBC # FLD: 15 % — HIGH (ref 10.3–14.5)
SODIUM SERPL-SCNC: 140 MMOL/L — SIGNIFICANT CHANGE UP (ref 135–145)
SP GR SPEC: 1 — LOW (ref 1.01–1.02)
UROBILINOGEN FLD QL: NEGATIVE — SIGNIFICANT CHANGE UP
WBC # BLD: 7.07 K/UL — SIGNIFICANT CHANGE UP (ref 3.8–10.5)
WBC # FLD AUTO: 7.07 K/UL — SIGNIFICANT CHANGE UP (ref 3.8–10.5)

## 2022-09-29 PROCEDURE — 85025 COMPLETE CBC W/AUTO DIFF WBC: CPT

## 2022-09-29 PROCEDURE — 99284 EMERGENCY DEPT VISIT MOD MDM: CPT | Mod: 25

## 2022-09-29 PROCEDURE — 80053 COMPREHEN METABOLIC PANEL: CPT

## 2022-09-29 PROCEDURE — 83690 ASSAY OF LIPASE: CPT

## 2022-09-29 PROCEDURE — 36415 COLL VENOUS BLD VENIPUNCTURE: CPT

## 2022-09-29 PROCEDURE — 74177 CT ABD & PELVIS W/CONTRAST: CPT | Mod: ME

## 2022-09-29 PROCEDURE — 81001 URINALYSIS AUTO W/SCOPE: CPT

## 2022-09-29 PROCEDURE — 96374 THER/PROPH/DIAG INJ IV PUSH: CPT | Mod: XU

## 2022-09-29 PROCEDURE — G1004: CPT

## 2022-09-29 PROCEDURE — 99285 EMERGENCY DEPT VISIT HI MDM: CPT

## 2022-09-29 PROCEDURE — 87086 URINE CULTURE/COLONY COUNT: CPT

## 2022-09-29 PROCEDURE — 74177 CT ABD & PELVIS W/CONTRAST: CPT | Mod: 26,ME

## 2022-09-29 RX ORDER — ASPIRIN/CALCIUM CARB/MAGNESIUM 324 MG
0 TABLET ORAL
Qty: 0 | Refills: 0 | DISCHARGE

## 2022-09-29 RX ORDER — SODIUM CHLORIDE 9 MG/ML
1000 INJECTION INTRAMUSCULAR; INTRAVENOUS; SUBCUTANEOUS ONCE
Refills: 0 | Status: COMPLETED | OUTPATIENT
Start: 2022-09-29 | End: 2022-09-29

## 2022-09-29 RX ORDER — EPLERENONE 50 MG/1
0 TABLET, FILM COATED ORAL
Qty: 0 | Refills: 0 | DISCHARGE

## 2022-09-29 RX ORDER — ATORVASTATIN CALCIUM 80 MG/1
1 TABLET, FILM COATED ORAL
Qty: 0 | Refills: 0 | DISCHARGE

## 2022-09-29 RX ORDER — ACETAMINOPHEN 500 MG
1000 TABLET ORAL ONCE
Refills: 0 | Status: COMPLETED | OUTPATIENT
Start: 2022-09-29 | End: 2022-09-29

## 2022-09-29 RX ORDER — METOPROLOL TARTRATE 50 MG
1 TABLET ORAL
Qty: 0 | Refills: 0 | DISCHARGE

## 2022-09-29 RX ADMIN — Medication 1000 MILLIGRAM(S): at 19:36

## 2022-09-29 RX ADMIN — Medication 400 MILLIGRAM(S): at 16:43

## 2022-09-29 RX ADMIN — SODIUM CHLORIDE 1000 MILLILITER(S): 9 INJECTION INTRAMUSCULAR; INTRAVENOUS; SUBCUTANEOUS at 16:43

## 2022-09-29 NOTE — ED ADULT NURSE NOTE - OBJECTIVE STATEMENT
the patient presents to the er for complaints of pain to [facility] since yesterday.  she denies any n/v/d.  she denies any urinary symptoms.  she states that the pain has been constant.  Nothing she does / doesn't do alleviates it.  she is alert / oriented x4.  iv access obtained, labs / urine collected.  Medication administered as per order, awaiting CT scan.  sydney gonzalez.

## 2022-09-29 NOTE — ED ADULT NURSE REASSESSMENT NOTE - NS ED NURSE REASSESS COMMENT FT1
Pt resting in stretcher no acute distress at this time pt denies abd pain or shortness at this time, patient to be discharge.

## 2022-09-29 NOTE — ED ADULT TRIAGE NOTE - CHIEF COMPLAINT QUOTE
Patient walked in with c/o LLQ abdominal pain x48 hours. Patient reports she went to urgent care last night and was told that it was diverticulitis and that she should come to the ER for a CT scan. Patient reports she started taking two antibiotics, went to work today and did not feel well so left work and came here. Patient denies N/V/D, fevers, urinary complaints.

## 2022-09-29 NOTE — ED PROVIDER NOTE - CARE PROVIDER_API CALL
Rob Joyner ()  Internal Medicine  [address] [place] [address]  Phone: [phone]  Fax: [phone]  Follow Up Time: 1-3 Days

## 2022-09-29 NOTE — ED PROVIDER NOTE - OBJECTIVE STATEMENT
58 yo female with h/o htn, hld, cad with 1 stent presents to the ED c/o LLQ abd pain x 2 days. Pain is constant, dull, nonradiating, worse with movement. PAtient went to UC last night, was prescribed cipro and flagyl for possible diverticulitis and recommended coming to ED for further eval/treatment. + h/o nephrectomy and c section. Denies fever, chills, chest pain, sob, N/V/D, urinary sxs, flank pain.

## 2022-09-29 NOTE — ED PROVIDER NOTE - PATIENT PORTAL LINK FT
You can access the FollowMyHealth Patient Portal offered by [facility] by registering at the following website: [email or web address]. By joining [employer]’s FollowMyHealth portal, you will also be able to view your health information using other applications (apps) compatible with our system.

## 2022-09-29 NOTE — ED ADULT NURSE NOTE - NSICDXPASTMEDICALHX_GEN_ALL_CORE_FT
PAST MEDICAL HISTORY:  Hyperlipidemia     Hypertension      PAST MEDICAL HISTORY:  Acute myocardial infarction 2018  1 stent    Hyperlipidemia     Hypertension

## 2022-09-29 NOTE — ED PROVIDER NOTE - CLINICAL SUMMARY MEDICAL DECISION MAKING FREE TEXT BOX
57-year-old female with complaints of left lower quadrant abdominal pain times few days.  Patient was diagnosed with diverticulitis placed on antibiotics yesterday at the urgent care.  Patient with continued and persistent symptoms.  Labs, CT rule out perforation, diverticulitis, gastroenteritis, UTI.

## 2022-09-30 LAB
CULTURE RESULTS: SIGNIFICANT CHANGE UP
SPECIMEN SOURCE: SIGNIFICANT CHANGE UP

## 2023-02-06 ENCOUNTER — EMERGENCY (EMERGENCY)
Facility: HOSPITAL | Age: 58
LOS: 1 days | Discharge: HOME | End: 2023-02-06
Attending: STUDENT IN AN ORGANIZED HEALTH CARE EDUCATION/TRAINING PROGRAM | Admitting: STUDENT IN AN ORGANIZED HEALTH CARE EDUCATION/TRAINING PROGRAM
Payer: COMMERCIAL

## 2023-02-06 VITALS
DIASTOLIC BLOOD PRESSURE: 90 MMHG | OXYGEN SATURATION: 99 % | HEIGHT: 67 IN | WEIGHT: 199.96 LBS | SYSTOLIC BLOOD PRESSURE: 164 MMHG | RESPIRATION RATE: 18 BRPM | HEART RATE: 60 BPM | TEMPERATURE: 97 F

## 2023-02-06 DIAGNOSIS — I21.9 ACUTE MYOCARDIAL INFARCTION, UNSPECIFIED: Chronic | ICD-10-CM

## 2023-02-06 DIAGNOSIS — Z98.891 HISTORY OF UTERINE SCAR FROM PREVIOUS SURGERY: Chronic | ICD-10-CM

## 2023-02-06 DIAGNOSIS — Z98.890 OTHER SPECIFIED POSTPROCEDURAL STATES: Chronic | ICD-10-CM

## 2023-02-06 DIAGNOSIS — Z90.5 ACQUIRED ABSENCE OF KIDNEY: Chronic | ICD-10-CM

## 2023-02-06 DIAGNOSIS — Z95.828 PRESENCE OF OTHER VASCULAR IMPLANTS AND GRAFTS: Chronic | ICD-10-CM

## 2023-02-06 LAB
ALBUMIN SERPL ELPH-MCNC: 3.7 G/DL — SIGNIFICANT CHANGE UP (ref 3.3–5)
ALP SERPL-CCNC: 96 U/L — SIGNIFICANT CHANGE UP (ref 40–120)
ALT FLD-CCNC: 33 U/L — SIGNIFICANT CHANGE UP (ref 12–78)
ANION GAP SERPL CALC-SCNC: 7 MMOL/L — SIGNIFICANT CHANGE UP (ref 5–17)
APTT BLD: 30.3 SEC — SIGNIFICANT CHANGE UP (ref 27.5–35.5)
AST SERPL-CCNC: 31 U/L — SIGNIFICANT CHANGE UP (ref 15–37)
BASOPHILS # BLD AUTO: 0.03 K/UL — SIGNIFICANT CHANGE UP (ref 0–0.2)
BASOPHILS NFR BLD AUTO: 0.4 % — SIGNIFICANT CHANGE UP (ref 0–2)
BILIRUB SERPL-MCNC: 0.4 MG/DL — SIGNIFICANT CHANGE UP (ref 0.2–1.2)
BUN SERPL-MCNC: 27 MG/DL — HIGH (ref 7–23)
CALCIUM SERPL-MCNC: 9 MG/DL — SIGNIFICANT CHANGE UP (ref 8.5–10.1)
CHLORIDE SERPL-SCNC: 106 MMOL/L — SIGNIFICANT CHANGE UP (ref 96–108)
CO2 SERPL-SCNC: 26 MMOL/L — SIGNIFICANT CHANGE UP (ref 22–31)
CREAT SERPL-MCNC: 1.2 MG/DL — SIGNIFICANT CHANGE UP (ref 0.5–1.3)
D DIMER BLD IA.RAPID-MCNC: 188 NG/ML DDU — SIGNIFICANT CHANGE UP
EGFR: 52 ML/MIN/1.73M2 — LOW
EOSINOPHIL # BLD AUTO: 0.3 K/UL — SIGNIFICANT CHANGE UP (ref 0–0.5)
EOSINOPHIL NFR BLD AUTO: 4.4 % — SIGNIFICANT CHANGE UP (ref 0–6)
GLUCOSE SERPL-MCNC: 88 MG/DL — SIGNIFICANT CHANGE UP (ref 70–99)
HCT VFR BLD CALC: 38.6 % — SIGNIFICANT CHANGE UP (ref 34.5–45)
HGB BLD-MCNC: 12 G/DL — SIGNIFICANT CHANGE UP (ref 11.5–15.5)
IMM GRANULOCYTES NFR BLD AUTO: 0.3 % — SIGNIFICANT CHANGE UP (ref 0–0.9)
INR BLD: 0.91 RATIO — SIGNIFICANT CHANGE UP (ref 0.88–1.16)
LYMPHOCYTES # BLD AUTO: 1.11 K/UL — SIGNIFICANT CHANGE UP (ref 1–3.3)
LYMPHOCYTES # BLD AUTO: 16.3 % — SIGNIFICANT CHANGE UP (ref 13–44)
MCHC RBC-ENTMCNC: 25.4 PG — LOW (ref 27–34)
MCHC RBC-ENTMCNC: 31.1 GM/DL — LOW (ref 32–36)
MCV RBC AUTO: 81.6 FL — SIGNIFICANT CHANGE UP (ref 80–100)
MONOCYTES # BLD AUTO: 0.65 K/UL — SIGNIFICANT CHANGE UP (ref 0–0.9)
MONOCYTES NFR BLD AUTO: 9.6 % — SIGNIFICANT CHANGE UP (ref 2–14)
NEUTROPHILS # BLD AUTO: 4.68 K/UL — SIGNIFICANT CHANGE UP (ref 1.8–7.4)
NEUTROPHILS NFR BLD AUTO: 69 % — SIGNIFICANT CHANGE UP (ref 43–77)
NRBC # BLD: 0 /100 WBCS — SIGNIFICANT CHANGE UP (ref 0–0)
PLATELET # BLD AUTO: 176 K/UL — SIGNIFICANT CHANGE UP (ref 150–400)
POTASSIUM SERPL-MCNC: 4.4 MMOL/L — SIGNIFICANT CHANGE UP (ref 3.5–5.3)
POTASSIUM SERPL-SCNC: 4.4 MMOL/L — SIGNIFICANT CHANGE UP (ref 3.5–5.3)
PROT SERPL-MCNC: 7.5 G/DL — SIGNIFICANT CHANGE UP (ref 6–8.3)
PROTHROM AB SERPL-ACNC: 10.6 SEC — SIGNIFICANT CHANGE UP (ref 10.5–13.4)
RBC # BLD: 4.73 M/UL — SIGNIFICANT CHANGE UP (ref 3.8–5.2)
RBC # FLD: 14.9 % — HIGH (ref 10.3–14.5)
SODIUM SERPL-SCNC: 139 MMOL/L — SIGNIFICANT CHANGE UP (ref 135–145)
TROPONIN I, HIGH SENSITIVITY RESULT: 28.6 NG/L — SIGNIFICANT CHANGE UP
WBC # BLD: 6.79 K/UL — SIGNIFICANT CHANGE UP (ref 3.8–10.5)
WBC # FLD AUTO: 6.79 K/UL — SIGNIFICANT CHANGE UP (ref 3.8–10.5)

## 2023-02-06 PROCEDURE — 85610 PROTHROMBIN TIME: CPT

## 2023-02-06 PROCEDURE — 85379 FIBRIN DEGRADATION QUANT: CPT

## 2023-02-06 PROCEDURE — 85730 THROMBOPLASTIN TIME PARTIAL: CPT

## 2023-02-06 PROCEDURE — 93010 ELECTROCARDIOGRAM REPORT: CPT

## 2023-02-06 PROCEDURE — 71045 X-RAY EXAM CHEST 1 VIEW: CPT

## 2023-02-06 PROCEDURE — 96374 THER/PROPH/DIAG INJ IV PUSH: CPT

## 2023-02-06 PROCEDURE — 93005 ELECTROCARDIOGRAM TRACING: CPT

## 2023-02-06 PROCEDURE — 84484 ASSAY OF TROPONIN QUANT: CPT

## 2023-02-06 PROCEDURE — 80053 COMPREHEN METABOLIC PANEL: CPT

## 2023-02-06 PROCEDURE — 85025 COMPLETE CBC W/AUTO DIFF WBC: CPT

## 2023-02-06 PROCEDURE — 99285 EMERGENCY DEPT VISIT HI MDM: CPT | Mod: 25

## 2023-02-06 PROCEDURE — 36415 COLL VENOUS BLD VENIPUNCTURE: CPT

## 2023-02-06 PROCEDURE — 71045 X-RAY EXAM CHEST 1 VIEW: CPT | Mod: 26

## 2023-02-06 PROCEDURE — 99285 EMERGENCY DEPT VISIT HI MDM: CPT

## 2023-02-06 RX ORDER — ACETAMINOPHEN 500 MG
1000 TABLET ORAL ONCE
Refills: 0 | Status: COMPLETED | OUTPATIENT
Start: 2023-02-06 | End: 2023-02-06

## 2023-02-06 RX ADMIN — Medication 400 MILLIGRAM(S): at 21:17

## 2023-02-06 NOTE — ED PROVIDER NOTE - NSICDXPASTMEDICALHX_GEN_ALL_CORE_FT
PAST MEDICAL HISTORY:  Acute myocardial infarction 2018  1 stent    Hyperlipidemia     Hypertension

## 2023-02-06 NOTE — ED ADULT TRIAGE NOTE - CHIEF COMPLAINT QUOTE
Patient has been having "chest pain" for 24 hours.  Patient points to the pain under her left breast.  Patient has a history of cardiac stent.  Patient has history of left shoulder repair Oct 2021.  Patient states that pain is same when sitting down as it is when exerting herself.

## 2023-02-06 NOTE — ED PROVIDER NOTE - CARE PROVIDER_API CALL
Harlan Butts)  Cardiovascular Disease; Internal Medicine  [address], [place] [id number]  Phone: [phone]  Fax: [phone]  Follow Up Time: 1-3 Days

## 2023-02-06 NOTE — ED PROVIDER NOTE - CLINICAL SUMMARY MEDICAL DECISION MAKING FREE TEXT BOX
57-year-old female with past medical history of hypertension, hyperlipidemia, and CAD with stent placement presents today complaining of left-sided chest pain x24 hours.  Patient reports the pain was intermittent but then became constant starting at 3:00.  Patient describes the pain as aching, worsened with left arm movement, and currently 5 out of 10.  Patient takes a baby aspirin daily and took her dose today.  Patient reports she has a history of left shoulder replacement in 2021.  Patient reports that she ran upstairs to grab a comforter in which she then became short of breath  Patient denies shortness of breath currently, hemoptysis, leg swelling, recent travel, recent surgery, palpitations, syncope, or any other complaints.  pain radiates below breast and to upper back,   ro acs, ro pe, ro ptx, labs, trop, dimer ekg, cxr, reassess

## 2023-02-06 NOTE — ED PROVIDER NOTE - OBJECTIVE STATEMENT
57-year-old female with past medical history of hypertension, hyperlipidemia, and CAD with stent placement presents today complaining of left-sided chest pain x24 hours.  Patient reports the pain was intermittent but then became constant starting at 3:00.  Patient describes the pain as aching, worsened with left arm movement, and currently 5 out of 10.  Patient takes a baby aspirin daily and took her dose today.  Patient reports she has a history of left shoulder replacement in 2021.  Patient reports that she ran upstairs to grab a comforter in which she then became short of breath and.  Patient denies shortness of breath currently, hemoptysis, leg swelling, recent travel, recent surgery, palpitations, syncope, or any other complaints.

## 2023-02-06 NOTE — ED PROVIDER NOTE - PATIENT PORTAL LINK FT
You can access the FollowMyHealth Patient Portal offered by [facility] by registering at the following website: [email or web address]. By joining [employer]’s FollowMyHealth portal, you will also be able to view your health information using other applications (apps) compatible with our system.

## 2023-02-06 NOTE — ED PROVIDER NOTE - NSFOLLOWUPINSTRUCTIONS_ED_ALL_ED_FT
Follow up with cardiology. Return for chest pain, shortness of breath, palpitations, dizziness, worsening condition.       Nonspecific Chest Pain, Adult      Chest pain is an uncomfortable, tight, or painful feeling in the chest. The pain can feel like a crushing, aching, or squeezing pressure. A person can feel a burning or tingling sensation. Chest pain can also be felt in your back, neck, jaw, shoulder, or arm. This pain can be worse when you move, sneeze, or take a deep breath.    Chest pain can be caused by a condition that is life-threatening. This must be treated right away. It can also be caused by something that is not life-threatening. If you have chest pain, it can be hard to know the difference, so it is important to get help right away to make sure that you do not have a serious condition.    Some life-threatening causes of chest pain include:  •Heart attack.      •A tear in the body's main blood vessel (aortic dissection).      •Inflammation around your heart (pericarditis).      •A problem in the lungs, such as a blood clot (pulmonary embolism) or a collapsed lung (pneumothorax).      Some non life-threatening causes of chest pain include:  •Heartburn.      •Anxiety or stress.      •Damage to the bones, muscles, and cartilage that make up your chest wall.      •Pneumonia or bronchitis.      •Shingles infection (varicella-zoster virus).      Your chest pain may come and go. It may also be constant. Your health care provider will do tests and other studies to find the cause of your pain. Treatment will depend on the cause of your chest pain.      Follow these instructions at home:    Medicines     •Take over-the-counter and prescription medicines only as told by your health care provider.      •If you were prescribed an antibiotic medicine, take it as told by your health care provider. Do not stop taking the antibiotic even if you start to feel better.      Activity     •Avoid any activities that cause chest pain.      • Do not lift anything that is heavier than 10 lb (4.5 kg), or the limit that you are told, until your health care provider says that it is safe.      •Rest as directed by your health care provider.       •Return to your normal activities only as told by your health care provider. Ask your health care provider what activities are safe for you.        Lifestyle       A plate along with examples of foods in a healthy diet.       Silhouette of a person sitting on the floor doing yoga.     • Do not use any products that contain nicotine or tobacco, such as cigarettes, e-cigarettes, and chewing tobacco. If you need help quitting, ask your health care provider.      • Do not drink alcohol.    •Make healthy lifestyle changes as recommended. These may include:  •Getting regular exercise. Ask your health care provider to suggest some exercises that are safe for you.      •Eating a heart-healthy diet. This includes plenty of fresh fruits and vegetables, whole grains, low-fat (lean) protein, and low-fat dairy products. A dietitian can help you find healthy eating options.      •Maintaining a healthy weight.      •Managing any other health conditions you may have, such as high blood pressure (hypertension) or diabetes.      •Reducing stress, such as with yoga or relaxation techniques.        General instructions     •Pay attention to any changes in your symptoms.      •It is up to you to get the results of any tests that were done. Ask your health care provider, or the department that is doing the tests, when your results will be ready.      •Keep all follow-up visits as told by your health care provider. This is important.       •You may be asked to go for further testing if your chest pain does not go away.        Contact a health care provider if:    •Your chest pain does not go away.      •You feel depressed.      •You have a fever.      •You notice changes in your symptoms or develop new symptoms.        Get help right away if:    •Your chest pain gets worse.      •You have a cough that gets worse, or you cough up blood.      •You have severe pain in your abdomen.      •You faint.      •You have sudden, unexplained chest discomfort.      •You have sudden, unexplained discomfort in your arms, back, neck, or jaw.      •You have shortness of breath at any time.      •You suddenly start to sweat, or your skin gets clammy.      •You feel nausea or you vomit.      •You suddenly feel lightheaded or dizzy.      •You have severe weakness, or unexplained weakness or fatigue.      •Your heart begins to beat quickly, or it feels like it is skipping beats.      These symptoms may represent a serious problem that is an emergency. Do not wait to see if the symptoms will go away. Get medical help right away. Call your local emergency services (911 in the [place] Do not drive yourself to the hospital.       Summary    •Chest pain can be caused by a condition that is serious and requires urgent treatment. It may also be caused by something that is not life-threatening.      •Your health care provider may do lab tests and other studies to find the cause of your pain.      •Follow your health care provider's instructions on taking medicines, making lifestyle changes, and getting emergency treatment if symptoms become worse.      •Keep all follow-up visits as told by your health care provider. This includes visits for any further testing if your chest pain does not go away.      This information is not intended to replace advice given to you by your health care provider. Make sure you discuss any questions you have with your health care provider.

## 2023-02-06 NOTE — ED ADULT NURSE NOTE - OBJECTIVE STATEMENT
Received pt to rm 13A  Pt c/o L nipple and breast pain radiating to shoulder and back  Hx of cardiac stent and shoulder sx

## 2023-02-07 PROBLEM — I21.9 ACUTE MYOCARDIAL INFARCTION, UNSPECIFIED: Chronic | Status: ACTIVE | Noted: 2022-09-29

## 2023-05-17 NOTE — ED ADULT NURSE NOTE - NS ED NURSE LEVEL OF CONSCIOUSNESS AFFECT
Anxious Solaraze Pregnancy And Lactation Text: This medication is Pregnancy Category B and is considered safe. There is some data to suggest avoiding during the third trimester. It is unknown if this medication is excreted in breast milk.

## 2023-10-06 NOTE — ED PROVIDER NOTE - IV ALTEPLASE EXCL REL HIDDEN
PROGRESS NOTE - [facility] INFECTIOUS DISEASE    SUBJECTIVE:  Patient seen and examined this morning. Afebrile, awake alert. Feels ok. Reports eating a little breakfast this morning. Confused. Has abdominal discomfort. Went for LE dopplers this morning. No fevers or chills. No cp. Stable on 2L NC. WBC 16-->12.6--> 11.3k today.      s/p difficult but successful IR cholecystostomy drain 10/3/23      ALLERGIES  ALLERGIES:  No Known Allergies          PHYSICAL EXAM  Visit Vitals  /87 (BP Location: RUE - Right upper extremity, Patient Position: Semi-Duenas's)   Pulse 96   Temp 98.4 °F (36.9 °C) (Axillary)   Resp 18   Ht 5' 10\" (1.778 m)   Wt 70.3 kg (155 lb)   SpO2 97%   BMI 22.24 kg/m²      Physical Exam  General:awake, alert. resting in bed.  Appears comfortable. Responsive. Mildly confused  Eye:  Pupils are equal, round and reactive to light,  EOMI. Normal conjunctiva,     HENT:  Normocephalic.  mmm. Poor dentition  Neck:  Supple, Non-tender.    Respiratory:  diminished bs, no wheeze. Non labored. On 2L NC  Cardiovascular:  Normal rate, Regular rhythm, No m/r/g  Gastrointestinal:  Soft, mild ttp RUQ and RLQ. No distention, active bowel sounds. RUQ Shana tube in place with bilious drainage, no surrounding erythema, warmth at exit site  Genitourinary:  No CVA tenderness  Integumentary:  Warm. No rash. +UE ecchymosis  Musculoskeletal: Normal ROM. julio bl LE edema  Neurologic: alert and awake. Mental status at baseline. No focal deficits. CN II-XII intact  Psychiatric: cooperative. Appropriate mood      LABORATORY  Recent Results (from the past 24 hour(s))   GLUCOSE, BEDSIDE - POINT OF CARE    Collection Time: 10/05/23 12:31 PM   Result Value Ref Range    GLUCOSE, BEDSIDE - POINT OF CARE 95 70 - 99 mg/dL   GLUCOSE, BEDSIDE - POINT OF CARE    Collection Time: 10/05/23  6:03 PM   Result Value Ref Range    GLUCOSE, BEDSIDE - POINT OF CARE 92 70 - 99 mg/dL   GLUCOSE, BEDSIDE - POINT OF CARE    Collection Time: 10/05/23   9:14 PM   Result Value Ref Range    GLUCOSE, BEDSIDE - POINT OF CARE 91 70 - 99 mg/dL   GLUCOSE, BEDSIDE - POINT OF CARE    Collection Time: 10/06/23  7:09 AM   Result Value Ref Range    GLUCOSE, BEDSIDE - POINT OF CARE 67 (L) 70 - 99 mg/dL   Basic Metabolic Panel    Collection Time: 10/06/23  7:36 AM   Result Value Ref Range    Fasting Status      Sodium 143 135 - 145 mmol/L    Potassium 3.9 3.4 - 5.1 mmol/L    Chloride 115 (H) 97 - 110 mmol/L    Carbon Dioxide 21 21 - 32 mmol/L    Anion Gap 11 7 - 19 mmol/L    Glucose 149 (H) 70 - 99 mg/dL    BUN 21 (H) 6 - 20 mg/dL    Creatinine 2.05 (H) 0.67 - 1.17 mg/dL    Glomerular Filtration Rate 30 (L) >=60    BUN/Cr 10 7 - 25    Calcium 8.8 8.4 - 10.2 mg/dL   GLUCOSE, BEDSIDE - POINT OF CARE    Collection Time: 10/06/23  7:36 AM   Result Value Ref Range    GLUCOSE, BEDSIDE - POINT OF CARE 137 (H) 70 - 99 mg/dL   GLUCOSE, BEDSIDE - POINT OF CARE    Collection Time: 10/06/23  9:13 AM   Result Value Ref Range    GLUCOSE, BEDSIDE - POINT OF CARE 107 (H) 70 - 99 mg/dL   CBC with Automated Differential (performable only)    Collection Time: 10/06/23  9:20 AM   Result Value Ref Range    WBC 11.3 (H) 4.2 - 11.0 K/mcL    RBC 3.65 (L) 4.50 - 5.90 mil/mcL    HGB 11.4 (L) 13.0 - 17.0 g/dL    HCT 36.1 (L) 39.0 - 51.0 %    MCV 98.9 78.0 - 100.0 fl    MCH 31.2 26.0 - 34.0 pg    MCHC 31.6 (L) 32.0 - 36.5 g/dL    RDW-CV 18.4 (H) 11.0 - 15.0 %    RDW-SD 66.5 (H) 39.0 - 50.0 fL     140 - 450 K/mcL    NRBC 0 <=0 /100 WBC    Neutrophil, Percent 91 %    Lymphocytes, Percent 4 %    Mono, Percent 4 %    Eosinophils, Percent 0 %    Basophils, Percent 0 %    Immature Granulocytes 1 %    Absolute Neutrophils 10.2 (H) 1.8 - 7.7 K/mcL    Absolute Lymphocytes 0.5 (L) 1.0 - 4.0 K/mcL    Absolute Monocytes 0.5 0.3 - 0.9 K/mcL    Absolute Eosinophils  0.0 0.0 - 0.5 K/mcL    Absolute Basophils 0.0 0.0 - 0.3 K/mcL    Absolute Immature Granulocytes 0.1 0.0 - 0.2 K/mcL         IMAGING  US VASC  EXTREMITY LOWER VENOUS DUPLEX   Final Result      No evidence of acute DVT of the adequately visualized bilateral lower   extremities as above.            Electronically Signed by: TAYLER BALL MD    Signed on: 10/6/2023 9:12 AM    Workstation ID: [id number]      XR CHEST AP OR PA   Final Result   Impression:       Findings suspicious for fluid overload including mild interstitial   pulmonary edema and small bilateral pleural effusions. Hazy bibasilar   opacities may reflect atelectasis or infection in the proper clinical   context.       Small lucency projecting over the right lung apex, likely reflecting   artifact rather than small apical pneumothorax. Consider follow-up chest   radiograph in 2 hours.      Electronically Signed by: JAY LOWRY MD    Signed on: 10/4/2023 9:24 PM    Workstation ID: [id number]      IR CHOLECYSTOSTOMY   Final Result   1. No percutaneous antegrade cholangiogram demonstrates no intrahepatic   biliary ductal dilatation.   2. Numerous cholelithiasis.   3. Wandering gallbladder.   4. Difficult and challenging image guided insertion of percutaneous   cholecystostomy. An 8 Turk[place] drain was placed within the gallbladder. The   procedure was challenging due to wandering gallbladder as well as due to   small percutaneous window to access the gallbladder as the gallbladder is   surrounded by bowel loops.       PLAN:   1. Cholecystostomy tube should be flushed with 10 mL sterile normal saline   at least once a day.      2. The cholecystostomy tube should be left in place until the gallbladder   is surgically removed, if there is no plan to remove the gallbladder   surgically then the cholecystostomy tube should be exchanged in 8-10 weeks.          3. Prior to the next cholecystostomy exchange, the patient should obtain an   outpatient surgical consultation.        Electronically Signed by: NORBERTO ELDRIDGE M.D.    Signed on: 10/4/2023 11:16 AM    Workstation ID: [id number]      ERCP    Final Result      Unable to advance ERCP scope to the second part despite dilation up to 18    mm      RECOMMENDATIONS:      Return to floor   Clear liquid diet   IR evaluation for percutaneous biliary drain         Sherry Rendon MD   10/2/2023 2:51 PM               ERCP   Final Result      MRI MRCP WO CONTRAST   Final Result      US LIVER GALLBLADDER PANCREAS (RUQ)   Final Result   This study demonstrates cholelithiasis without evidence of cholecystitis   and diffuse biliary dilatation.      The preceding CT demonstrates distal common duct stone lodged at the   ampulla. Recommend ERCP. Finding discussed by me with Dr. Epps in the ER   at 1442 hours.         Electronically Signed by: TONY RAMIREZ MD    Signed on: 9/28/2023 2:42 PM    Workstation ID: [id number]      CT ABDOMEN PELVIS WO CONTRAST   Final Result   Mild central intrahepatic biliary ductal prominence. Question stone in the   distal CBD. Multiple gallstones.      Wall thickening of the stomach may represent mild gastritis.      Atrophic right kidney again seen. No hydronephrosis. enlarged prostate   gland.      Appendix is normal. Sigmoid diverticulosis.      Small fat-containing right inguinal hernia.               Electronically Signed by: PHOENIX RIVAS MD    Signed on: 9/28/2023 11:02 AM    Workstation ID: [id number]      XR CHEST PA OR AP 1 VIEW   Final Result   Impression:    No acute cardiopulmonary abnormality.   Emphysema.      Electronically Signed by: DIANE PERSON MD    Signed on: 9/28/2023 7:43 AM    Workstation ID: [id number]      EGD w Dilation; Catheter    (Results Pending)   IR CHOLECYSTOSTOMY    (Results Pending)         MICRO  9/28 blood cx: 2/2 Neg  9/28 sars cov2/flu/rsv negative  9/28 RVP: negative      ASSESSMENT/PLAN    Antibiotics:  Flagyl 9/28-  omnicef 10/6-    Sp  Ceftriaxone 9/28-10/5    Impression:    # sepsis - likely biliary in nature ; improved  # cholelithiasis / choledocholithiasis   - sp  unsuccessful ERCP on 9/30 due to bulbar stenosis, plans for balloon dilation    -  Sp ERCP attempted 10/2, unable to pass scope   - sp difficult but successful IR cholecystostomy drain 10/3/23  # fever ; resolved  # leukocytosis ; had resolved but now increased sp christophe tube  # transaminitis   # dementia      Plan:    - continue PO omnicef and PO flagyl x 1 more day for total 10d treatment. EOT 10/7  - follow temps, labs. WBC downtrending  - monitor clinical status  - GI following, appreciate recs.   - sp difficult but successful IR cholecystostomy drain 10/3/23. Drain managenemt per IR  - prior notes, imaging and labs reviewed  - dw patient, rn  - discussed case/plan of care with Dr Jacobson  - ok for discharge from ID standpoint once medically cleared    Jessica Neal PA-C   10/6/2023    show

## 2023-10-10 ENCOUNTER — NON-APPOINTMENT (OUTPATIENT)
Age: 58
End: 2023-10-10

## 2024-03-06 ENCOUNTER — NON-APPOINTMENT (OUTPATIENT)
Age: 59
End: 2024-03-06

## 2025-06-19 NOTE — ED PROVIDER NOTE - CPE EDP EYES NORM
Chief Complaint   Patient presents with    Office Visit     Nail care.        Referring Provider: Dr. Asha Limon   Date Last Seen: 3/21/2025    Referring Podiatrist: Dr. Selina Green  Date Last Seen: 2/24/2025  This is an established patient and diagnosis. Reference podiatrist note for full exam.    Exam    Nail/foot condition:   Elongated, thickened, yellow, crumbly, dystrophic and incurvated nails 1 through 5 on the right and nails 1 through 5 on the left.       Procedure:    Nails:  Nails Treated: x10                  1-5 bilateral    What was done:   Debridement                    Instrument used: nail nipper 6 inch and nail drill with sanding cap        Manuela was seen today for office visit.    Diagnoses and all orders for this visit:    Diabetic neuropathy with neurologic complication  (CMD)    Tinea unguium         Dr. Selina Green was the supervising clinician on site.     normal...

## 2025-08-05 ENCOUNTER — TRANSCRIPTION ENCOUNTER (OUTPATIENT)
Age: 60
End: 2025-08-05